# Patient Record
Sex: FEMALE | Race: WHITE
[De-identification: names, ages, dates, MRNs, and addresses within clinical notes are randomized per-mention and may not be internally consistent; named-entity substitution may affect disease eponyms.]

---

## 2020-06-26 ENCOUNTER — NURSE TRIAGE (OUTPATIENT)
Dept: OTHER | Facility: CLINIC | Age: 60
End: 2020-06-26

## 2024-06-03 ENCOUNTER — LAB REQUISITION (OUTPATIENT)
Dept: LAB | Facility: HOSPITAL | Age: 64
End: 2024-06-03
Payer: COMMERCIAL

## 2024-06-03 DIAGNOSIS — R14.0 ABDOMINAL DISTENSION (GASEOUS): ICD-10-CM

## 2024-06-03 PROCEDURE — 88305 TISSUE EXAM BY PATHOLOGIST: CPT | Performed by: PATHOLOGY

## 2024-06-03 PROCEDURE — 88305 TISSUE EXAM BY PATHOLOGIST: CPT

## 2024-06-15 LAB
LABORATORY COMMENT REPORT: NORMAL
PATH REPORT.FINAL DX SPEC: NORMAL
PATH REPORT.GROSS SPEC: NORMAL
PATH REPORT.TOTAL CANCER: NORMAL

## 2024-07-31 ENCOUNTER — LAB REQUISITION (OUTPATIENT)
Dept: LAB | Facility: HOSPITAL | Age: 64
End: 2024-07-31
Payer: COMMERCIAL

## 2024-07-31 DIAGNOSIS — R14.0 ABDOMINAL DISTENSION (GASEOUS): ICD-10-CM

## 2024-07-31 PROCEDURE — 0753T DGTZ GLS MCRSCP SLD LEVEL IV: CPT

## 2024-07-31 PROCEDURE — 88305 TISSUE EXAM BY PATHOLOGIST: CPT

## 2024-07-31 PROCEDURE — 88305 TISSUE EXAM BY PATHOLOGIST: CPT | Performed by: PATHOLOGY

## 2024-08-06 LAB
LABORATORY COMMENT REPORT: NORMAL
PATH REPORT.FINAL DX SPEC: NORMAL
PATH REPORT.GROSS SPEC: NORMAL
PATH REPORT.MICROSCOPIC SPEC OTHER STN: NORMAL
PATH REPORT.RELEVANT HX SPEC: NORMAL
PATH REPORT.TOTAL CANCER: NORMAL

## 2025-03-05 ENCOUNTER — APPOINTMENT (OUTPATIENT)
Dept: OBSTETRICS AND GYNECOLOGY | Facility: CLINIC | Age: 65
End: 2025-03-05
Payer: COMMERCIAL

## 2025-03-05 VITALS — WEIGHT: 146 LBS | HEIGHT: 63 IN | BODY MASS INDEX: 25.87 KG/M2

## 2025-03-05 DIAGNOSIS — Z01.419 WOMEN'S ANNUAL ROUTINE GYNECOLOGICAL EXAMINATION: Primary | ICD-10-CM

## 2025-03-05 PROCEDURE — 3008F BODY MASS INDEX DOCD: CPT | Performed by: OBSTETRICS & GYNECOLOGY

## 2025-03-05 PROCEDURE — 1036F TOBACCO NON-USER: CPT | Performed by: OBSTETRICS & GYNECOLOGY

## 2025-03-05 PROCEDURE — 99396 PREV VISIT EST AGE 40-64: CPT | Performed by: OBSTETRICS & GYNECOLOGY

## 2025-03-05 RX ORDER — AMLODIPINE BESYLATE 5 MG/1
5 TABLET ORAL DAILY
COMMUNITY

## 2025-03-05 RX ORDER — FLUTICASONE FUROATE, UMECLIDINIUM BROMIDE AND VILANTEROL TRIFENATATE 100; 62.5; 25 UG/1; UG/1; UG/1
POWDER RESPIRATORY (INHALATION)
COMMUNITY
Start: 2025-01-31

## 2025-03-05 RX ORDER — ALPRAZOLAM 0.5 MG/1
0.5 TABLET ORAL 2 TIMES DAILY
COMMUNITY

## 2025-03-05 RX ORDER — ONDANSETRON 4 MG/1
1 TABLET, FILM COATED ORAL
COMMUNITY
Start: 2024-11-20

## 2025-03-05 RX ORDER — PANTOPRAZOLE SODIUM 40 MG/1
1 TABLET, DELAYED RELEASE ORAL
COMMUNITY
Start: 2025-02-19

## 2025-03-05 ASSESSMENT — ENCOUNTER SYMPTOMS
LOSS OF SENSATION IN FEET: 0
DEPRESSION: 0
OCCASIONAL FEELINGS OF UNSTEADINESS: 0

## 2025-03-05 ASSESSMENT — PATIENT HEALTH QUESTIONNAIRE - PHQ9
1. LITTLE INTEREST OR PLEASURE IN DOING THINGS: NOT AT ALL
SUM OF ALL RESPONSES TO PHQ9 QUESTIONS 1 AND 2: 0
2. FEELING DOWN, DEPRESSED OR HOPELESS: NOT AT ALL

## 2025-03-05 ASSESSMENT — COLUMBIA-SUICIDE SEVERITY RATING SCALE - C-SSRS
6. HAVE YOU EVER DONE ANYTHING, STARTED TO DO ANYTHING, OR PREPARED TO DO ANYTHING TO END YOUR LIFE?: NO
1. IN THE PAST MONTH, HAVE YOU WISHED YOU WERE DEAD OR WISHED YOU COULD GO TO SLEEP AND NOT WAKE UP?: NO
2. HAVE YOU ACTUALLY HAD ANY THOUGHTS OF KILLING YOURSELF?: NO

## 2025-03-05 NOTE — PROGRESS NOTES
Destinee Reddy is a 64 y.o. female who presents with a chief complaint of Annual Exam      SUBJECTIVE  annual    Past Medical History:   Diagnosis Date    Breast cancer (Multi)     Gastroparesis     IBS (irritable bowel syndrome)     Lung hemorrhage     Melanoma (Multi)     Scoliosis      Past Surgical History:   Procedure Laterality Date    LUNG SURGERY      bleed repair    MASTECTOMY Right      Social History     Socioeconomic History    Marital status:    Tobacco Use    Smoking status: Former     Types: Cigarettes    Smokeless tobacco: Never   Vaping Use    Vaping status: Never Used   Substance and Sexual Activity    Alcohol use: Yes     Comment: social    Drug use: Never    Sexual activity: Not Currently     Social Drivers of Health     Food Insecurity: No Food Insecurity (12/23/2023)    Received from [place] [place] [facility]    Food Insecurity     Social/Environmental Concerns: No concerns   Transportation Needs: No Transportation Needs (12/23/2023)    Received from [facility]    Transportation Needs     Social/Environmental Concerns: No concerns   Intimate Partner Violence: Not At Risk (12/23/2023)    Received from [facility]    Feeling Safe      Are you in a relationship with someone who hurts you emotionally and/or physically?: No   Housing Stability: Low Risk  (12/23/2023)    Received from [facility]    Housing Stability     Social/Environmental Concerns: No concerns     No family history on file.    OB History   No obstetric history on file.       OBJECTIVE  Allergies   Allergen Reactions    Penicillins Anaphylaxis and Swelling      (Not in a hospital admission)       Review of Systems  History obtained from the patient  General ROS: negative  Psychological ROS: negative  Gastrointestinal  "ROS: no abdominal pain, change in bowel habits, or black or bloody stools  Musculoskeletal ROS: negative  Physical Exam  General Appearance: awake, alert, oriented, in no acute distress, well developed, well nourished, and in no acute distress  Skin: there are no suspicious lesions or rashes of concern, skin color, texture, turgor are normal; there are no bruises, rashes or lesions.  Head/Face: NCAT  Eyes: No gross abnormalities., PERRL, and EOMI  Neck: neck- supple, no mass, non-tender  Back: no pain to palpation  Breast: BREAST EXAM: normal  Abdomen: Soft, non-tender, normal bowel sounds; no bruits, organomegaly or masses.  Extremities: Extremities warm to touch, pink, with no edema.  Musculoskeletal: negative  Urogen: External genitalia: Normal, Vagina: Normal, Cervix: Normal, and Bimanual exam: Normal    Ht 1.6 m (5' 3\")   Wt 66.2 kg (146 lb)   BMI 25.86 kg/m²    Problem List Items Addressed This Visit    None  Visit Diagnoses       Women's annual routine gynecological examination    -  Primary    Relevant Orders    THINPREP PAP               "

## 2025-03-20 LAB
CYTOLOGY CMNT CVX/VAG CYTO-IMP: NORMAL
LAB AP HPV GENOTYPE QUESTION: YES
LAB AP HPV HR: NORMAL
LABORATORY COMMENT REPORT: NORMAL
PATH REPORT.TOTAL CANCER: NORMAL

## 2025-04-03 ENCOUNTER — TELEPHONE (OUTPATIENT)
Facility: CLINIC | Age: 65
End: 2025-04-03
Payer: COMMERCIAL

## 2025-05-12 PROCEDURE — 88304 TISSUE EXAM BY PATHOLOGIST: CPT

## 2025-05-12 PROCEDURE — 88304 TISSUE EXAM BY PATHOLOGIST: CPT | Performed by: STUDENT IN AN ORGANIZED HEALTH CARE EDUCATION/TRAINING PROGRAM

## 2025-05-13 ENCOUNTER — LAB REQUISITION (OUTPATIENT)
Dept: LAB | Facility: HOSPITAL | Age: 65
End: 2025-05-13
Payer: COMMERCIAL

## 2025-05-13 DIAGNOSIS — D82.9: ICD-10-CM
